# Patient Record
Sex: FEMALE | URBAN - METROPOLITAN AREA
[De-identification: names, ages, dates, MRNs, and addresses within clinical notes are randomized per-mention and may not be internally consistent; named-entity substitution may affect disease eponyms.]

---

## 2022-10-04 ENCOUNTER — OFFICE VISIT (OUTPATIENT)
Dept: URBAN - METROPOLITAN AREA CLINIC 40 | Facility: CLINIC | Age: 62
End: 2022-10-04
Payer: MEDICARE

## 2022-10-04 VITALS
BODY MASS INDEX: 29.41 KG/M2 | DIASTOLIC BLOOD PRESSURE: 72 MMHG | TEMPERATURE: 97.7 F | HEIGHT: 63 IN | WEIGHT: 166 LBS | SYSTOLIC BLOOD PRESSURE: 110 MMHG | HEART RATE: 102 BPM

## 2022-10-04 DIAGNOSIS — K59.01 CONSTIPATION: ICD-10-CM

## 2022-10-04 DIAGNOSIS — R10.9 ABDOMINAL PAIN: ICD-10-CM

## 2022-10-04 DIAGNOSIS — R19.7 DIARRHEA: ICD-10-CM

## 2022-10-04 DIAGNOSIS — Z86.010 PERSONAL HISTORY OF COLON POLYPS: ICD-10-CM

## 2022-10-04 PROCEDURE — 99204 OFFICE O/P NEW MOD 45 MIN: CPT | Performed by: PHYSICIAN ASSISTANT

## 2022-10-04 RX ORDER — PANTOPRAZOLE SODIUM 40 MG/1
1 TABLET TABLET, DELAYED RELEASE ORAL ONCE A DAY
Qty: 30 TABLET | Refills: 2 | OUTPATIENT
Start: 2022-10-04

## 2022-10-04 RX ORDER — HYOSCYAMINE SULFATE 0.12 MG/1
1 TABLET UNDER THE TONGUE AND ALLOW TO DISSOLVE  AS NEEDED TABLET SUBLINGUAL THREE TIMES A DAY
Status: ACTIVE | COMMUNITY

## 2022-10-04 RX ORDER — ONDANSETRON HYDROCHLORIDE 4 MG/1
1 TABLET TABLET, FILM COATED ORAL
Qty: 30 TABLET | Refills: 1

## 2022-10-04 RX ORDER — HYOSCYAMINE SULFATE 0.12 MG/1
1 TABLET UNDER THE TONGUE AND ALLOW TO DISSOLVE  AS NEEDED TABLET SUBLINGUAL THREE TIMES A DAY
Qty: 90 TABLET | Refills: 0

## 2022-10-04 RX ORDER — FAMOTIDINE 20 MG/1
1 TABLET AT BEDTIME AS NEEDED TABLET, FILM COATED ORAL ONCE A DAY
Status: ACTIVE | COMMUNITY

## 2022-10-04 RX ORDER — DIPHENOXYLATE HYDROCHLORIDE AND ATROPINE SULFATE 2.5; .025 MG/1; MG/1
1 TABLET AS NEEDED TABLET ORAL
Status: ACTIVE | COMMUNITY

## 2022-10-04 RX ORDER — ONDANSETRON HYDROCHLORIDE 4 MG/1
1 TABLET TABLET, FILM COATED ORAL ONCE A DAY
Status: ACTIVE | COMMUNITY

## 2022-10-04 RX ORDER — LUBIPROSTONE 8 UG/1
1 CAPSULE WITH FOOD AND WATER CAPSULE, GELATIN COATED ORAL TWICE A DAY
Status: ACTIVE | COMMUNITY

## 2022-10-04 NOTE — HPI-TODAY'S VISIT:
Ms. George is a 62-year-old Black female who presents to the office today to schedule surveillance colonoscopy.  Back in 2019, she did have a colonoscopy with Dr. Bruner for 2 polyps removed from the ascending colon and proximal sigmoid colon , 3 and 1 mm respectively.  There was multiple diverticula of the sigmoid colon and a fair prep.  Per pathology, the ascending colon polyp was consistent with tubular adenoma and hyperplastic lesion in the sigmoid colon.  Due to the prep, is recommended she have surveillance in 3 years time.  We did receive records from her primary medical provider stating that she has had recent complaint of ongoing abdominal pain and diarrhea which is now resolved.  Of reported nausea and vomiting without hematemesis and epigastric abdominal pain.  Normal CBC, CMP, lipase and amylase.  An ultrasound was completed and the gallbladder did appear normal but there was evidence of moderate fatty liver.  Nexium had been prescribed previously and the patient was unable to tolerate Carafate as it caused headache.  She was also taking Zofran as needed.  Weight has been decreased due to decreased p.o. intake.  No evidence of anemia noted.  Potassium supplementation have been implemented due to recent nausea and vomiting.  Creatinine mildly elevated at 1.13 and potassium of 3.2.  Labs were forwarded to our office as well as ultrasound imaging.  She has a history of breast cancer, constipation, GERD, hyperlipidemia, hypertension, lumbar disc disease, chronic kidney disease, vitamin D deficiency, and asthma. Today, she continues to have mid epigastric abdominal pain which is nonradiating.  No vomiting today.  Admits she is eating very little and losing weight as result of not eating much.  No rectal bleeding or melena reported today.  Normal bowel movement yesterday.  Not currently on any PPI therapy but was given a prescription for famotidine.  Also was given Zofran as needed but does not think she has many left to take.  She has a prescription for hyoscyamine and is taking this when she can remember.  Also, she was given prescription for Amitiza for recent complaint of constipation.  Denies any tobacco or alcohol use.  Cannot identify any stressors recently but states in the last 2 months since the onset of symptoms of pain, change in bowel habits and decreased appetite, that she has had multiple medication changes.  She was given potassium recently and will follow-up with her primary medical provider to see if she still needs to take this, not currently vomiting or having diarrhea.  Denies use of NSAIDs.  No other complaints today.

## 2022-10-05 ENCOUNTER — TELEPHONE ENCOUNTER (OUTPATIENT)
Dept: URBAN - METROPOLITAN AREA CLINIC 40 | Facility: CLINIC | Age: 62
End: 2022-10-05

## 2022-10-10 ENCOUNTER — CLAIMS CREATED FROM THE CLAIM WINDOW (OUTPATIENT)
Dept: URBAN - METROPOLITAN AREA CLINIC 4 | Facility: CLINIC | Age: 62
End: 2022-10-10
Payer: MEDICARE

## 2022-10-10 ENCOUNTER — OFFICE VISIT (OUTPATIENT)
Dept: URBAN - METROPOLITAN AREA SURGERY CENTER 30 | Facility: SURGERY CENTER | Age: 62
End: 2022-10-10
Payer: MEDICARE

## 2022-10-10 DIAGNOSIS — R10.13 ABDOMINAL DISCOMFORT, EPIGASTRIC: ICD-10-CM

## 2022-10-10 DIAGNOSIS — K29.70 GASTRITIS, UNSPECIFIED, WITHOUT BLEEDING: ICD-10-CM

## 2022-10-10 DIAGNOSIS — K31.89 ACQUIRED DEFORMITY OF DUODENUM: ICD-10-CM

## 2022-10-10 DIAGNOSIS — Z86.010 ADENOMAS PERSONAL HISTORY OF COLONIC POLYPS: ICD-10-CM

## 2022-10-10 DIAGNOSIS — D12.0 ADENOMA OF CECUM: ICD-10-CM

## 2022-10-10 DIAGNOSIS — K31.89 OTHER DISEASES OF STOMACH AND DUODENUM: ICD-10-CM

## 2022-10-10 PROCEDURE — 45380 COLONOSCOPY AND BIOPSY: CPT | Performed by: INTERNAL MEDICINE

## 2022-10-10 PROCEDURE — 88305 TISSUE EXAM BY PATHOLOGIST: CPT | Performed by: PATHOLOGY

## 2022-10-10 PROCEDURE — G8907 PT DOC NO EVENTS ON DISCHARG: HCPCS | Performed by: INTERNAL MEDICINE

## 2022-10-10 PROCEDURE — 45385 COLONOSCOPY W/LESION REMOVAL: CPT | Performed by: INTERNAL MEDICINE

## 2022-10-10 PROCEDURE — 43239 EGD BIOPSY SINGLE/MULTIPLE: CPT | Performed by: INTERNAL MEDICINE

## 2022-10-10 PROCEDURE — 88312 SPECIAL STAINS GROUP 1: CPT | Performed by: PATHOLOGY

## 2022-10-10 PROCEDURE — 88342 IMHCHEM/IMCYTCHM 1ST ANTB: CPT | Performed by: PATHOLOGY

## 2022-10-10 RX ORDER — FAMOTIDINE 20 MG/1
1 TABLET AT BEDTIME AS NEEDED TABLET, FILM COATED ORAL ONCE A DAY
Status: ACTIVE | COMMUNITY

## 2022-10-10 RX ORDER — PANTOPRAZOLE SODIUM 40 MG/1
1 TABLET TABLET, DELAYED RELEASE ORAL ONCE A DAY
Qty: 30 TABLET | Refills: 2 | OUTPATIENT

## 2022-10-10 RX ORDER — DIPHENOXYLATE HYDROCHLORIDE AND ATROPINE SULFATE 2.5; .025 MG/1; MG/1
1 TABLET AS NEEDED TABLET ORAL
Status: ACTIVE | COMMUNITY

## 2022-10-10 RX ORDER — HYOSCYAMINE SULFATE 0.12 MG/1
1 TABLET UNDER THE TONGUE AND ALLOW TO DISSOLVE  AS NEEDED TABLET SUBLINGUAL THREE TIMES A DAY
Qty: 90 TABLET | Refills: 0 | Status: ACTIVE | COMMUNITY

## 2022-10-10 RX ORDER — PANTOPRAZOLE SODIUM 40 MG/1
1 TABLET TABLET, DELAYED RELEASE ORAL ONCE A DAY
Qty: 30 TABLET | Refills: 2 | Status: ACTIVE | COMMUNITY
Start: 2022-10-04

## 2022-10-10 RX ORDER — LUBIPROSTONE 8 UG/1
1 CAPSULE WITH FOOD AND WATER CAPSULE, GELATIN COATED ORAL TWICE A DAY
Status: ACTIVE | COMMUNITY

## 2022-10-10 RX ORDER — ONDANSETRON HYDROCHLORIDE 4 MG/1
1 TABLET TABLET, FILM COATED ORAL
Qty: 30 TABLET | Refills: 1 | Status: ACTIVE | COMMUNITY

## 2022-11-15 ENCOUNTER — OFFICE VISIT (OUTPATIENT)
Dept: URBAN - METROPOLITAN AREA CLINIC 40 | Facility: CLINIC | Age: 62
End: 2022-11-15

## 2022-11-15 RX ORDER — PANTOPRAZOLE SODIUM 40 MG/1
1 TABLET TABLET, DELAYED RELEASE ORAL ONCE A DAY
Qty: 30 TABLET | Refills: 2 | Status: ACTIVE | COMMUNITY

## 2022-11-15 RX ORDER — ONDANSETRON HYDROCHLORIDE 4 MG/1
1 TABLET TABLET, FILM COATED ORAL
Qty: 30 TABLET | Refills: 1 | Status: ACTIVE | COMMUNITY

## 2022-11-15 RX ORDER — HYOSCYAMINE SULFATE 0.12 MG/1
1 TABLET UNDER THE TONGUE AND ALLOW TO DISSOLVE  AS NEEDED TABLET SUBLINGUAL THREE TIMES A DAY
Qty: 90 TABLET | Refills: 0 | Status: ACTIVE | COMMUNITY

## 2022-11-15 RX ORDER — FAMOTIDINE 20 MG/1
1 TABLET AT BEDTIME AS NEEDED TABLET, FILM COATED ORAL ONCE A DAY
Status: ACTIVE | COMMUNITY

## 2022-11-15 RX ORDER — PANTOPRAZOLE SODIUM 40 MG/1
1 TABLET TABLET, DELAYED RELEASE ORAL ONCE A DAY
Qty: 30 TABLET | Refills: 2 | OUTPATIENT

## 2022-11-15 RX ORDER — LUBIPROSTONE 8 UG/1
1 CAPSULE WITH FOOD AND WATER CAPSULE, GELATIN COATED ORAL TWICE A DAY
Status: ACTIVE | COMMUNITY

## 2022-11-15 RX ORDER — DIPHENOXYLATE HYDROCHLORIDE AND ATROPINE SULFATE 2.5; .025 MG/1; MG/1
1 TABLET AS NEEDED TABLET ORAL
Status: ACTIVE | COMMUNITY

## 2022-11-15 NOTE — HPI-TODAY'S VISIT:
Ms. George is a 62-year-old Black female who presents to the office today to schedule surveillance colonoscopy.  Back in 2019, she did have a colonoscopy with Dr. Bruner for 2 polyps removed from the ascending colon and proximal sigmoid colon , 3 and 1 mm respectively.  There was multiple diverticula of the sigmoid colon and a fair prep.  Per pathology, the ascending colon polyp was consistent with tubular adenoma and hyperplastic lesion in the sigmoid colon.  Due to the prep, is recommended she have surveillance in 3 years time.  We did receive records from her primary medical provider stating that she has had recent complaint of ongoing abdominal pain and diarrhea which is now resolved.  Of reported nausea and vomiting without hematemesis and epigastric abdominal pain.  Normal CBC, CMP, lipase and amylase.  An ultrasound was completed and the gallbladder did appear normal but there was evidence of moderate fatty liver.  Nexium had been prescribed previously and the patient was unable to tolerate Carafate as it caused headache.  She was also taking Zofran as needed.  Weight has been decreased due to decreased p.o. intake.  No evidence of anemia noted.  Potassium supplementation have been implemented due to recent nausea and vomiting.  Creatinine mildly elevated at 1.13 and potassium of 3.2.  Labs were forwarded to our office as well as ultrasound imaging.  She has a history of breast cancer, constipation, GERD, hyperlipidemia, hypertension, lumbar disc disease, chronic kidney disease, vitamin D deficiency, and asthma. Today, she continues to have mid epigastric abdominal pain which is nonradiating.  No vomiting today.  Admits she is eating very little and losing weight as result of not eating much.  No rectal bleeding or melena reported today.  Normal bowel movement yesterday.  Not currently on any PPI therapy but was given a prescription for famotidine.  Also was given Zofran as needed but does not think she has many left to take.  She has a prescription for hyoscyamine and is taking this when she can remember.  Also, she was given prescription for Amitiza for recent complaint of constipation.  Denies any tobacco or alcohol use.  Cannot identify any stressors recently but states in the last 2 months since the onset of symptoms of pain, change in bowel habits and decreased appetite, that she has had multiple medication changes.  She was given potassium recently and will follow-up with her primary medical provider to see if she still needs to take this, not currently vomiting or having diarrhea.  Denies use of NSAIDs.  No other complaints today. Patient underwent EGD and colonoscopy on 10/10/2022.  Endoscopy revealed a normal-appearing esophagus, diffuse moderate gastritis was found which was biopsied the remainder of endoscopy was within normal limits duodenal biopsies were also taken.  Gastric biopsy showed benign inflammation duodenal biopsies were unremarkable.  Patient's colonoscopy revealed multiple sigmoid diverticuli, 2 small polyps were removed from the sigmoid colon and cecum 2 larger polyps 12 to 15 mm in size were removed from the cecum.  The pathology of the larger polyps were tubulovillous adenoma smaller sigmoid colon polyp was benign mucosal polyp.

## 2022-12-16 ENCOUNTER — TELEPHONE ENCOUNTER (OUTPATIENT)
Dept: URBAN - METROPOLITAN AREA CLINIC 63 | Facility: CLINIC | Age: 62
End: 2022-12-16

## 2023-01-14 ENCOUNTER — DASHBOARD ENCOUNTERS (OUTPATIENT)
Age: 63
End: 2023-01-14

## 2023-01-14 PROBLEM — 14760008 CONSTIPATION: Status: ACTIVE | Noted: 2022-10-04

## 2023-01-17 ENCOUNTER — OFFICE VISIT (OUTPATIENT)
Dept: URBAN - METROPOLITAN AREA CLINIC 40 | Facility: CLINIC | Age: 63
End: 2023-01-17

## 2023-01-17 RX ORDER — PANTOPRAZOLE SODIUM 40 MG/1
1 TABLET TABLET, DELAYED RELEASE ORAL ONCE A DAY
Qty: 30 TABLET | Refills: 2 | OUTPATIENT
